# Patient Record
Sex: MALE | Race: BLACK OR AFRICAN AMERICAN | NOT HISPANIC OR LATINO | URBAN - METROPOLITAN AREA
[De-identification: names, ages, dates, MRNs, and addresses within clinical notes are randomized per-mention and may not be internally consistent; named-entity substitution may affect disease eponyms.]

---

## 2023-03-10 ENCOUNTER — HOSPITAL ENCOUNTER (EMERGENCY)
Facility: HOSPITAL | Age: 33
Discharge: HOME | End: 2023-03-10
Attending: EMERGENCY MEDICINE
Payer: MEDICAID

## 2023-03-10 VITALS
HEIGHT: 71 IN | HEART RATE: 82 BPM | OXYGEN SATURATION: 97 % | BODY MASS INDEX: 22.4 KG/M2 | TEMPERATURE: 99 F | RESPIRATION RATE: 18 BRPM | WEIGHT: 160 LBS | SYSTOLIC BLOOD PRESSURE: 133 MMHG | DIASTOLIC BLOOD PRESSURE: 72 MMHG

## 2023-03-10 DIAGNOSIS — B34.9 VIRAL ILLNESS: Primary | ICD-10-CM

## 2023-03-10 LAB
CTP QC/QA: YES
CTP QC/QA: YES
POC MOLECULAR INFLUENZA A AGN: NEGATIVE
POC MOLECULAR INFLUENZA B AGN: NEGATIVE
SARS-COV-2 RDRP RESP QL NAA+PROBE: NEGATIVE

## 2023-03-10 PROCEDURE — 99283 EMERGENCY DEPT VISIT LOW MDM: CPT

## 2023-03-10 PROCEDURE — 25000003 PHARM REV CODE 250: Performed by: NURSE PRACTITIONER

## 2023-03-10 PROCEDURE — 87502 INFLUENZA DNA AMP PROBE: CPT

## 2023-03-10 RX ORDER — ACETAMINOPHEN 500 MG
1000 TABLET ORAL
Status: COMPLETED | OUTPATIENT
Start: 2023-03-10 | End: 2023-03-10

## 2023-03-10 RX ORDER — IBUPROFEN 800 MG/1
800 TABLET ORAL EVERY 8 HOURS PRN
Qty: 30 TABLET | Refills: 0 | Status: SHIPPED | OUTPATIENT
Start: 2023-03-10

## 2023-03-10 RX ORDER — ACETAMINOPHEN 500 MG
1000 TABLET ORAL EVERY 6 HOURS PRN
Qty: 100 TABLET | Refills: 0 | Status: SHIPPED | OUTPATIENT
Start: 2023-03-10

## 2023-03-10 RX ADMIN — ACETAMINOPHEN 1000 MG: 500 TABLET ORAL at 10:03

## 2023-03-10 NOTE — Clinical Note
"Deysi Blancasdano Woodard was seen and treated in our emergency department on 3/10/2023.  He may return to work on 03/13/2023.       If you have any questions or concerns, please don't hesitate to call.      Iona Ramos NP"

## 2023-03-11 NOTE — ED PROVIDER NOTES
Encounter Date: 3/10/2023    SCRIBE #1 NOTE: I, Sandhya Watson, am scribing for, and in the presence of,  Iona Ramos NP. I have scribed the following portions of the note - Other sections scribed: HPI, ROS.     History     Chief Complaint   Patient presents with    COVID-19 Concerns     Generalized body aches, no smell. Fever and chills.      Deysi Woodard is a 32 y.o. male, with no pertinent PMHx, who presents to the ED complaining of fever, chills, cough, myalgias, and sore throat onset 2 hours ago. Patient also reports he can't smell or taste anything. No other exacerbating or alleviating factors. Denies abdominal pain, vomiting, diarrhea, or other associated symptoms. Patient's immunizations are up to date. Patient is COVID-19 vaccinated.    The history is provided by the patient.   Review of patient's allergies indicates:  No Known Allergies  No past medical history on file.  No past surgical history on file.  No family history on file.     Review of Systems   Constitutional:  Positive for chills and fever.   HENT:  Positive for sore throat.    Respiratory:  Positive for cough. Negative for shortness of breath.    Cardiovascular:  Negative for chest pain.   Gastrointestinal:  Negative for nausea.   Genitourinary:  Negative for dysuria.   Musculoskeletal:  Positive for myalgias. Negative for back pain.   Skin:  Negative for rash.   Neurological:  Negative for weakness.   Hematological:  Does not bruise/bleed easily.   All other systems reviewed and are negative.    Physical Exam     Initial Vitals [03/10/23 2003]   BP Pulse Resp Temp SpO2   123/69 99 18 100 °F (37.8 °C) 99 %      MAP       --         Physical Exam    Nursing note and vitals reviewed.  Constitutional: He appears well-developed and well-nourished.   HENT:   Head: Normocephalic.   Mouth/Throat: Oropharynx is clear and moist.   Eyes: Conjunctivae are normal.   Neck: Neck supple.   Normal range of motion.  Cardiovascular:  Normal rate,  regular rhythm and normal heart sounds.           Pulmonary/Chest: Breath sounds normal. No respiratory distress. He has no wheezes. He has no rhonchi. He has no rales.   Abdominal: Abdomen is soft. There is no abdominal tenderness.   Musculoskeletal:         General: Normal range of motion.      Cervical back: Normal range of motion and neck supple.     Lymphadenopathy:     He has no cervical adenopathy.   Neurological: He is alert and oriented to person, place, and time. GCS score is 15. GCS eye subscore is 4. GCS verbal subscore is 5. GCS motor subscore is 6.   Skin: Skin is warm and dry. Capillary refill takes less than 2 seconds.   Psychiatric: He has a normal mood and affect.       ED Course   Procedures  Labs Reviewed   SARS-COV-2 RDRP GENE   POCT INFLUENZA A/B MOLECULAR          Imaging Results    None          Medications   acetaminophen tablet 1,000 mg (1,000 mg Oral Given 3/10/23 2236)     Medical Decision Making:   History:   Old Medical Records: I decided to obtain old medical records.  Differential Diagnosis:   COVID, influenza, viral illness  Clinical Tests:   Lab Tests: Ordered and Reviewed  ED Management:  Diagnosis management comments: This is an urgent evaluation of a 32-year-old male that presented to the ER with c/o body aches, fever, chills, sore throat x2 hours . Pts exam was as above.     Labs were reviewed and discussed with pt. patient has been instructed to continue using Tylenol and Motrin to keep his fever and body aches under control.  To increase his fluid intake and return to ER for worsening symptoms or any other concerns.    Based on exam today - I have low suspicion for medical, surgical or other life threatening condition and I believe pt is safe for discharge and outpatient f/u.    Pt verbalizes understanding of d/c instructions and will return for worsening condition.              Scribe Attestation:   Scribe #1: I performed the above scribed service and the documentation  accurately describes the services I performed. I attest to the accuracy of the note.                   Clinical Impression:   Final diagnoses:  [B34.9] Viral illness (Primary)        ED Disposition Condition    Discharge Stable        I, Iona Ramos NP-C  , personally performed the services described in this documentation. All medical record entries made by the scribe were at my direction and in my presence. I have reviewed the chart and agree that the record reflects my personal performance and is accurate and complete.   ED Prescriptions       Medication Sig Dispense Start Date End Date Auth. Provider    ibuprofen (ADVIL,MOTRIN) 800 MG tablet Take 1 tablet (800 mg total) by mouth every 8 (eight) hours as needed for Pain. 30 tablet 3/10/2023 -- Iona Ramos NP    acetaminophen (TYLENOL) 500 MG tablet Take 2 tablets (1,000 mg total) by mouth every 6 (six) hours as needed for Pain (or fever). 100 tablet 3/10/2023 -- Iona Ramos NP          Follow-up Information       Follow up With Specialties Details Why Contact Info    [facility]  Schedule an appointment as soon as possible for a visit   [address] [place] [address]  [phone]      [facility] Emergency Medicine  If symptoms worsen or any other concerns [address] [facility] [address] [phone]             Iona Ramos NP  03/10/23 3620

## 2023-03-11 NOTE — DISCHARGE INSTRUCTIONS
Increase fluid intake.   Take tylenol/ motrin around the clock to help with fever and body aches.  Return to er for worsening symptoms or any other concerns.